# Patient Record
Sex: MALE | Race: WHITE | NOT HISPANIC OR LATINO | URBAN - METROPOLITAN AREA
[De-identification: names, ages, dates, MRNs, and addresses within clinical notes are randomized per-mention and may not be internally consistent; named-entity substitution may affect disease eponyms.]

---

## 2018-04-25 ENCOUNTER — EMERGENCY (EMERGENCY)
Facility: HOSPITAL | Age: 73
LOS: 0 days | Discharge: HOME | End: 2018-04-25
Attending: EMERGENCY MEDICINE | Admitting: EMERGENCY MEDICINE

## 2018-04-25 VITALS
WEIGHT: 179.9 LBS | TEMPERATURE: 98 F | OXYGEN SATURATION: 97 % | HEIGHT: 68 IN | DIASTOLIC BLOOD PRESSURE: 75 MMHG | SYSTOLIC BLOOD PRESSURE: 113 MMHG | RESPIRATION RATE: 18 BRPM | HEART RATE: 83 BPM

## 2018-04-25 VITALS — HEIGHT: 68 IN | WEIGHT: 179.9 LBS

## 2018-04-25 DIAGNOSIS — Z88.1 ALLERGY STATUS TO OTHER ANTIBIOTIC AGENTS STATUS: ICD-10-CM

## 2018-04-25 DIAGNOSIS — Z48.89 ENCOUNTER FOR OTHER SPECIFIED SURGICAL AFTERCARE: ICD-10-CM

## 2018-04-25 DIAGNOSIS — L02.212 CUTANEOUS ABSCESS OF BACK [ANY PART, EXCEPT BUTTOCK]: ICD-10-CM

## 2018-04-25 RX ORDER — AZTREONAM 2 G
1 VIAL (EA) INJECTION
Qty: 14 | Refills: 0 | OUTPATIENT
Start: 2018-04-25 | End: 2018-05-01

## 2018-04-25 NOTE — ED PROVIDER NOTE - MEDICAL DECISION MAKING DETAILS
I personally evaluated the patient. I reviewed the Resident’s or Physician Assistant’s note (as assigned above), and agree with the findings and plan except as documented in my note. 71yo M with a h/o RA. About 1 weeks ago pt had a cyst for many years and last week was at a function and the area became irritated, pt went to an C and was given ABX. Pt with acute reaction to doxy after being out in the sun. No fever, chills, n/v, cp, sob, pleuritic cp, palpitations, diaphoresis, cough, ha/lh/dizziness, numbness/tingling, neck pain/ stiffness, abd pain, diarrhea, constipation, melena/brbpr, urinary symptoms, trauma, weakness, edema, calf pain/swelling/erythema, sick contacts, recent travel or rash.  Vital Signs: I have reviewed the initial vital signs. Constitutional: WDWN in nad. Sitting on stretcher in nad.  Integumentary: No rash. ENT: MMM NECK: Supple, non-tender, no menineal signs. Cardiovascular: RRR, radial pulses 2/4 b/l. No JVD. Respiratory: BS present b/l, ctabl, no wheezing or crackles, good resp effort and excursion, good air exchange,  no accessory muscle use, no stridor. Gastrointestinal: BS present throughout all 4 quadrants, soft, nd, nt, no rebound tenderness or guarding, no cvat. Musculoskeletal: FROM, no edema, no calf pain/swelling/erythema. Neurologic: AAOx3, motor 5/5 and sensation intact throughout upper and lowe ext, CN II-XII intact, No facial droop or slurring of speech. No focal deficits.  left lumbar 3x3cm abscess mild erythema, ecchymosis present, no crepitus, mild fluctuance, no induration, no active purulent d/c, no odor. Doxy was changed to augmentin on Monday 73yo M with a h/o RA and small cyst to  L lower back present for many years presents w/ ~1 week ago of abscess where cyst was. last week was at a function and the area became irritated, pt went to an C and was given ABX- doxy. Pt with acute reaction to doxycycline after being out in the sun. went back to the urgent care today and switched abx to augmentin and told to come to ed for eval. of abscess. No fever, chills, n/v, cp, sob, pleuritic cp, palpitations, diaphoresis, cough, ha/lh/dizziness, numbness/tingling, neck pain/ stiffness, abd pain, diarrhea, constipation, melena/brbpr, urinary symptoms, trauma, weakness, edema, calf pain/swelling/erythema, sick contacts, recent travel. No history of MRSA. On exam; Vital Signs: I have reviewed the initial vital signs. Constitutional: WDWN in nad. Integumentary: Erythema to arms and face from doxy reaction, L upper buttock with ~ 3X3 cm abscess healing well, no surrounding erythema, +fluctuance present, no active purulent discahrge, very small puncture wound present from previous incision where pt reports not much was drained from, mild pain to palpation, no crepitus, no odor, no other abcesses. ENT: MMM NECK: Supple, non-tender, no meningeal signs. Cardiovascular: RRR, radial pulses 2/4 b/l. No JVD. Respiratory: BS present b/l, ctabl, no wheezing or crackles, good resp effort and excursion, good air exchange,  no accessory muscle use, no stridor. Gastrointestinal: BS present throughout all 4 quadrants, soft, nd, nt, no rebound tenderness or guarding, no cvat. Musculoskeletal: FROM, no edema.Neurologic: AAOx3, motor 5/5 and sensation intact throughout upper and lowe ext, CN II-XII intact, No facial droop or slurring of speech. No focal deficits.    Plan: I and D, Doxy was changed to augmentin, will add bactrom, reassess.

## 2018-04-25 NOTE — ED PROVIDER NOTE - OBJECTIVE STATEMENT
71 yo male sent to ED  for wound check lower back. Patient had cyst to right lower back for years. On 4/16 he was started on Doxycycline. On 4/23 he was switched to Doxycyline (patient had photosensitivity) and was I&D. Today went for a wound check and was sent to ED by [facility] RN. No fevers.

## 2018-04-25 NOTE — ED PROVIDER NOTE - PROGRESS NOTE DETAILS
will make larger incision for better drainage of abscess pt has very small incion done at [facility], bigger incision done with extensive amout of purulent discharge obtained, pt was taken off doxycycline monday, started on augmentin will add bactrim pt aware of probiotics, feeling better, no symptoms, aware of signs and syptoms to return for, surgery follow up. pt has very small incison done at [facility], bigger incision done with extensive amount of purulent discharge obtained, pt was taken off doxycycline monday, started on augmentin will add bactrim pt aware of probiotics, feeling better, no symptoms, aware of signs and syptoms to return for, surgery follow up.

## 2018-04-25 NOTE — ED PROVIDER NOTE - PHYSICAL EXAMINATION
Physical Exam  Constitutional: well-nourished, appears stated age, no acute distress  Skin: +healing abscess with 2mm opening with purulent drainage noted. +redness to skin (photosensitivity from Doxycycline)

## 2019-06-06 ENCOUNTER — EMERGENCY (EMERGENCY)
Facility: HOSPITAL | Age: 74
LOS: 0 days | Discharge: HOME | End: 2019-06-06
Attending: EMERGENCY MEDICINE | Admitting: EMERGENCY MEDICINE
Payer: MEDICARE

## 2019-06-06 VITALS
HEIGHT: 67 IN | SYSTOLIC BLOOD PRESSURE: 111 MMHG | TEMPERATURE: 97 F | OXYGEN SATURATION: 98 % | WEIGHT: 179.9 LBS | HEART RATE: 158 BPM | RESPIRATION RATE: 18 BRPM | DIASTOLIC BLOOD PRESSURE: 73 MMHG

## 2019-06-06 VITALS
DIASTOLIC BLOOD PRESSURE: 71 MMHG | SYSTOLIC BLOOD PRESSURE: 107 MMHG | RESPIRATION RATE: 18 BRPM | HEART RATE: 79 BPM | OXYGEN SATURATION: 98 %

## 2019-06-06 DIAGNOSIS — I47.1 SUPRAVENTRICULAR TACHYCARDIA: ICD-10-CM

## 2019-06-06 DIAGNOSIS — R07.9 CHEST PAIN, UNSPECIFIED: ICD-10-CM

## 2019-06-06 DIAGNOSIS — Z79.899 OTHER LONG TERM (CURRENT) DRUG THERAPY: ICD-10-CM

## 2019-06-06 LAB
ALBUMIN SERPL ELPH-MCNC: 4.1 G/DL — SIGNIFICANT CHANGE UP (ref 3.5–5.2)
ALP SERPL-CCNC: 103 U/L — SIGNIFICANT CHANGE UP (ref 30–115)
ALT FLD-CCNC: 23 U/L — SIGNIFICANT CHANGE UP (ref 0–41)
ANION GAP SERPL CALC-SCNC: 12 MMOL/L — SIGNIFICANT CHANGE UP (ref 7–14)
AST SERPL-CCNC: 29 U/L — SIGNIFICANT CHANGE UP (ref 0–41)
BASE EXCESS BLDV CALC-SCNC: 0.8 MMOL/L — SIGNIFICANT CHANGE UP (ref -2–2)
BILIRUB SERPL-MCNC: 0.3 MG/DL — SIGNIFICANT CHANGE UP (ref 0.2–1.2)
BUN SERPL-MCNC: 16 MG/DL — SIGNIFICANT CHANGE UP (ref 10–20)
CA-I SERPL-SCNC: 1.16 MMOL/L — SIGNIFICANT CHANGE UP (ref 1.12–1.3)
CALCIUM SERPL-MCNC: 9.4 MG/DL — SIGNIFICANT CHANGE UP (ref 8.5–10.1)
CHLORIDE SERPL-SCNC: 102 MMOL/L — SIGNIFICANT CHANGE UP (ref 98–110)
CO2 SERPL-SCNC: 26 MMOL/L — SIGNIFICANT CHANGE UP (ref 17–32)
CREAT SERPL-MCNC: 1.2 MG/DL — SIGNIFICANT CHANGE UP (ref 0.7–1.5)
GAS PNL BLDV: 143 MMOL/L — SIGNIFICANT CHANGE UP (ref 136–145)
GAS PNL BLDV: SIGNIFICANT CHANGE UP
GLUCOSE SERPL-MCNC: 138 MG/DL — HIGH (ref 70–99)
HCO3 BLDV-SCNC: 27 MMOL/L — SIGNIFICANT CHANGE UP (ref 22–29)
HCT VFR BLD CALC: 46.1 % — SIGNIFICANT CHANGE UP (ref 42–52)
HCT VFR BLDA CALC: 48.6 % — HIGH (ref 34–44)
HGB BLD CALC-MCNC: 15.8 G/DL — SIGNIFICANT CHANGE UP (ref 14–18)
HGB BLD-MCNC: 15 G/DL — SIGNIFICANT CHANGE UP (ref 14–18)
HOROWITZ INDEX BLDV+IHG-RTO: 21 — SIGNIFICANT CHANGE UP
LACTATE BLDV-MCNC: 1.5 MMOL/L — SIGNIFICANT CHANGE UP (ref 0.5–1.6)
LIDOCAIN IGE QN: 32 U/L — SIGNIFICANT CHANGE UP (ref 7–60)
MAGNESIUM SERPL-MCNC: 2.3 MG/DL — SIGNIFICANT CHANGE UP (ref 1.8–2.4)
MCHC RBC-ENTMCNC: 28.2 PG — SIGNIFICANT CHANGE UP (ref 27–31)
MCHC RBC-ENTMCNC: 32.5 G/DL — SIGNIFICANT CHANGE UP (ref 32–37)
MCV RBC AUTO: 86.7 FL — SIGNIFICANT CHANGE UP (ref 80–94)
NRBC # BLD: 0 /100 WBCS — SIGNIFICANT CHANGE UP (ref 0–0)
NT-PROBNP SERPL-SCNC: 476 PG/ML — HIGH (ref 0–300)
PCO2 BLDV: 50 MMHG — SIGNIFICANT CHANGE UP (ref 41–51)
PH BLDV: 7.35 — SIGNIFICANT CHANGE UP (ref 7.26–7.43)
PLATELET # BLD AUTO: 297 K/UL — SIGNIFICANT CHANGE UP (ref 130–400)
PO2 BLDV: 25 MMHG — SIGNIFICANT CHANGE UP (ref 20–40)
POTASSIUM BLDV-SCNC: 4.8 MMOL/L — SIGNIFICANT CHANGE UP (ref 3.3–5.6)
POTASSIUM SERPL-MCNC: 4.9 MMOL/L — SIGNIFICANT CHANGE UP (ref 3.5–5)
POTASSIUM SERPL-SCNC: 4.9 MMOL/L — SIGNIFICANT CHANGE UP (ref 3.5–5)
PROT SERPL-MCNC: 6.9 G/DL — SIGNIFICANT CHANGE UP (ref 6–8)
RBC # BLD: 5.32 M/UL — SIGNIFICANT CHANGE UP (ref 4.7–6.1)
RBC # FLD: 13.2 % — SIGNIFICANT CHANGE UP (ref 11.5–14.5)
SAO2 % BLDV: 42 % — SIGNIFICANT CHANGE UP
SODIUM SERPL-SCNC: 140 MMOL/L — SIGNIFICANT CHANGE UP (ref 135–146)
TROPONIN T SERPL-MCNC: <0.01 NG/ML — SIGNIFICANT CHANGE UP
WBC # BLD: 9.2 K/UL — SIGNIFICANT CHANGE UP (ref 4.8–10.8)
WBC # FLD AUTO: 9.2 K/UL — SIGNIFICANT CHANGE UP (ref 4.8–10.8)

## 2019-06-06 PROCEDURE — 71045 X-RAY EXAM CHEST 1 VIEW: CPT | Mod: 26

## 2019-06-06 PROCEDURE — 99291 CRITICAL CARE FIRST HOUR: CPT | Mod: GC

## 2019-06-06 RX ORDER — ASPIRIN/CALCIUM CARB/MAGNESIUM 324 MG
325 TABLET ORAL ONCE
Refills: 0 | Status: COMPLETED | OUTPATIENT
Start: 2019-06-06 | End: 2019-06-06

## 2019-06-06 RX ORDER — DILTIAZEM HCL 120 MG
10 CAPSULE, EXT RELEASE 24 HR ORAL ONCE
Refills: 0 | Status: COMPLETED | OUTPATIENT
Start: 2019-06-06 | End: 2019-06-06

## 2019-06-06 RX ADMIN — Medication 10 MILLIGRAM(S): at 06:08

## 2019-06-06 RX ADMIN — Medication 325 MILLIGRAM(S): at 06:20

## 2019-06-06 NOTE — ED PROVIDER NOTE - CLINICAL SUMMARY MEDICAL DECISION MAKING FREE TEXT BOX
Pt presented w SVT. Converted to NSR after 10 mg diltiazem. Feels well. Observed in ED. I have full discussed the medical management and delivery of care with the patient. Patient confirms understanding and has been given detailed return precautions. Patient instructed to return to the ED should symptoms persist or worsen. Patient is well appearing upon discharge.

## 2019-06-06 NOTE — ED PROVIDER NOTE - NSFOLLOWUPINSTRUCTIONS_ED_ALL_ED_FT
Supraventricular Tachycardia    Supraventricular tachycardia (SVT) is a type of abnormal heart rhythm that causes your heart to beat very quickly. A normal heart rate is 60?100 beats per minute. During an episode of SVT, your heart rate may be 150?250 beats per minute. This can make you feel light-headed and short of breath. Although SVT is usually harmless, when SVT happens often or it lasts for long periods, it can lead to heart weakness and failure. SVT can be triggered by stress, smoking, alcohol, caffeine, or stimulant drugs. Treatment may involve certain maneuvers, medicines, or electric shock (cardioversion).     SEEK IMMEDIATE MEDICAL CARE IF YOU HAVE ANY OF THE FOLLOWING SYMPTOMS: chest pain, shortness of breath, dizziness/lightheadedness, or fainting.

## 2019-06-06 NOTE — ED PROVIDER NOTE - NS ED ROS FT
Constitutional: NAD  Eyes: No visual changes, eye pain or discharge.  ENMT: No hearing changes, pain, discharge or infections. No neck pain or stiffness.  Cardiac: + chest pain. No SOB or edema.   Respiratory: No cough or respiratory distress.   GI: No nausea, vomiting, diarrhea or abdominal pain.  : No dysuria, frequency or burning.  MS: No myalgia, muscle weakness, joint pain or back pain.  Neuro: No headache or weakness. No LOC.  Skin: No skin rash.  Heme: No bruising

## 2019-06-06 NOTE — ED PROVIDER NOTE - OBJECTIVE STATEMENT
73M pmh paroxysmal afib not on a/c p/w rapid heartbeat and midsternal CP since last night. Pt has had similar symptoms in past but usually it resolves on its own. Denies f/c, n/v/d, abd pain, back pain, calf pain/swelling.

## 2019-06-06 NOTE — ED PROVIDER NOTE - CARE PROVIDER_API CALL
J Carlos Griffin (MD)  Cardiovascular Disease; Internal Medicine; Interventional Cardiology  [address] [place] [address]  Phone: [phone]  Fax: [phone]  Follow Up Time:

## 2019-06-06 NOTE — ED PROVIDER NOTE - ATTENDING CONTRIBUTION TO CARE
I personally evaluated the patient. I reviewed the Resident’s or Physician Assistant’s note (as assigned above), and agree with the findings and plan except as documented in my note.    74 y/o M presents w rapid HR since 10 PM. No SOB.No PE risk factors. No nausea, vomiting. No abd pain.     CONSTITUTIONAL: Well-developed; well-nourished; in no acute distress. Sitting up and providing appropriate history and physical examination  SKIN: skin exam is warm and dry, no acute rash.  HEAD: Normocephalic; atraumatic.  EYES: PERRL, 3 mm bilateral, no nystagmus, EOM intact; conjunctiva and sclera clear.  ENT: No nasal discharge; airway clear.  NECK: Supple; non tender.+ full passive ROM in all directions. No JVD  CARD: S1, S2 normal; tachy, regular pulse  RESP: No wheezes, rales or rhonchi. Good air movement bilaterally  ABD: soft; non-distended; non-tender. No Rebound, No Gaurding, No signs of peritnitis, No CVA tenderness  EXT: Normal ROM. No clubbing, cyanosis or edema. Dp and Pt Pulses intact. Cap refill less than 3 seconds  NEURO: CN 2-12 intact, normal finger to nose, normal romberg, stable gait, no sensory or motor deficits, Alert, oriented, grossly unremarkable. No Focal deficits. GCS 15. NIH 0  PSYCH: Cooperative, appropriate.

## 2020-05-29 NOTE — ED PROVIDER NOTE - PHYSICAL EXAMINATION
Problem: Adult Inpatient Plan of Care  Goal: Plan of Care Review  Outcome: Ongoing, Progressing  Flowsheets (Taken 5/29/2020 1347)  Plan of Care Reviewed With: patient  Goal: Patient-Specific Goal (Individualization)  Outcome: Ongoing, Progressing  Flowsheets (Taken 5/29/2020 1347)  Individualized Care Needs: warm blankets,  Anxieties, Fears or Concerns: none     Problem: Fatigue (Oncology Care)  Goal: Improved Activity Tolerance  Outcome: Ongoing, Progressing  Intervention: Promote Energy Conservation  Flowsheets (Taken 5/29/2020 1347)  Fatigue Management: frequent rest breaks encouraged; fatigue-related activity identified  Sleep/Rest Enhancement: relaxation techniques promoted  Activity Management: activity encouraged     Pt tolerated IVF and zofran well today. Vitals remain stable. Reviewed follow-up appointments. All questions were answered, ambulated independently at d/c.    General: AOx4, Non toxic appearing, NAD, speaking in full sentences.   Skin: Warm and dry, no acute rash.   Head:  Normocephalic, atraumatic.   EENT: PERRL/EOMI, conjunctiva and sclera clear. MM moist, no nasal discharge.    Neck: Supple nt, no meningeal signs.   Lymph: No acute cervical lymphadenopathy  Heart tachy, regular s1s2 nl, no rub/murmur.   Lungs- No retractions, BS equal, CTAB.   Abdomen: Soft ntnd no r/g.   Extremities- moves all, +equal distal pulses, brisk cap refill. No LE edema, calves nttp b/l.  Neuro: Sensation wnl, CN 2-12 grossly intact. +5/5 muscle strength throughout.  Psych: Cooperative, appropriate

## 2021-02-01 ENCOUNTER — APPOINTMENT (OUTPATIENT)
Dept: RHEUMATOLOGY | Facility: CLINIC | Age: 76
End: 2021-02-01

## 2021-07-29 PROBLEM — I48.91 UNSPECIFIED ATRIAL FIBRILLATION: Chronic | Status: ACTIVE | Noted: 2019-06-06

## 2021-07-29 PROBLEM — Z00.00 ENCOUNTER FOR PREVENTIVE HEALTH EXAMINATION: Status: ACTIVE | Noted: 2021-07-29

## 2021-08-02 ENCOUNTER — NON-APPOINTMENT (OUTPATIENT)
Age: 76
End: 2021-08-02

## 2021-08-02 ENCOUNTER — APPOINTMENT (OUTPATIENT)
Dept: RHEUMATOLOGY | Facility: CLINIC | Age: 76
End: 2021-08-02
Payer: MEDICARE

## 2021-08-02 VITALS
TEMPERATURE: 97 F | BODY MASS INDEX: 28.25 KG/M2 | SYSTOLIC BLOOD PRESSURE: 140 MMHG | HEART RATE: 85 BPM | DIASTOLIC BLOOD PRESSURE: 60 MMHG | HEIGHT: 67 IN | WEIGHT: 180 LBS | OXYGEN SATURATION: 96 %

## 2021-08-02 DIAGNOSIS — M25.50 PAIN IN UNSPECIFIED JOINT: ICD-10-CM

## 2021-08-02 DIAGNOSIS — Z78.9 OTHER SPECIFIED HEALTH STATUS: ICD-10-CM

## 2021-08-02 DIAGNOSIS — Z82.3 FAMILY HISTORY OF STROKE: ICD-10-CM

## 2021-08-02 PROCEDURE — 99205 OFFICE O/P NEW HI 60 MIN: CPT

## 2021-08-02 NOTE — PHYSICAL EXAM
[General Appearance - Alert] : alert [General Appearance - In No Acute Distress] : in no acute distress [Sclera] : the sclera and conjunctiva were normal [PERRL With Normal Accommodation] : pupils were equal in size, round, and reactive to light [Extraocular Movements] : extraocular movements were intact [Outer Ear] : the ears and nose were normal in appearance [Oropharynx] : the oropharynx was normal [Neck Appearance] : the appearance of the neck was normal [Neck Cervical Mass (___cm)] : no neck mass was observed [Jugular Venous Distention Increased] : there was no jugular-venous distention [Thyroid Diffuse Enlargement] : the thyroid was not enlarged [Thyroid Nodule] : there were no palpable thyroid nodules [Auscultation Breath Sounds / Voice Sounds] : lungs were clear to auscultation bilaterally [Heart Rate And Rhythm] : heart rate was normal and rhythm regular [Heart Sounds] : normal S1 and S2 [Heart Sounds Gallop] : no gallops [Murmurs] : no murmurs [Heart Sounds Pericardial Friction Rub] : no pericardial rub [Bowel Sounds] : normal bowel sounds [Abdomen Soft] : soft [Abdomen Tenderness] : non-tender [Abdomen Mass (___ Cm)] : no abdominal mass palpated [Skin Color & Pigmentation] : normal skin color and pigmentation [Skin Turgor] : normal skin turgor [] : no rash [Deep Tendon Reflexes (DTR)] : deep tendon reflexes were 2+ and symmetric [Sensation] : the sensory exam was normal to light touch and pinprick [No Focal Deficits] : no focal deficits [Oriented To Time, Place, And Person] : oriented to person, place, and time [Impaired Insight] : insight and judgment were intact [Affect] : the affect was normal [FreeTextEntry1] : R thumb IP joint swollen

## 2021-08-02 NOTE — ASSESSMENT
[FreeTextEntry1] : 76 y M with history as below presented with joint pains. \par \par R thumb swelling \par Multiple joint pains\par d/d includes acute gout, palindromic rheumatism, CPPD, OA, Lyme\par recent UA was 7.9\par RF, CCP\par Lyme AB\par Xr hands, feet\par US R hand\par advised to avoid alcohol, red meat, seafood\par Refused medications (colchicine, meloxicam)\par He can take Aleve PRN at home\par \par vitamin D insufficiency\par will recheck level\par \par HM\par COVID vaccine advised\par \par fu 4 wk\par \par Total time spent was   . Time was divided in review of labs and imaging studies, obtaining history, personally examining patient, counselling patient/ family, ordering medications/tests/ imaging tests, communicating with other health care providers, documentation in electronic health records, independent interpretation of labs, imaging results and procedure results and communicating to the patient/family and care co ordination.\par \par

## 2021-08-02 NOTE — HISTORY OF PRESENT ILLNESS
[FreeTextEntry1] : 76 y M with history as below presented with joint pains. 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 He did not have any swelling. \par This year, he has had swelling episodes of finger joints , thumb joints lasting for 2 weeks. He got Indomethacin and  prednisone taper from PCP which resolved the episode. But after discontinuing the medicines the thumb joint swelling came back and persisted for 2 weeks. HE also has right wrist swelling as well. \par \par He denied any rashes, oral ulcers, chest pain, hair loss.

## 2021-08-03 ENCOUNTER — TRANSCRIPTION ENCOUNTER (OUTPATIENT)
Age: 76
End: 2021-08-03

## 2021-08-21 ENCOUNTER — TRANSCRIPTION ENCOUNTER (OUTPATIENT)
Age: 76
End: 2021-08-21

## 2021-08-21 ENCOUNTER — NON-APPOINTMENT (OUTPATIENT)
Age: 76
End: 2021-08-21

## 2021-08-30 ENCOUNTER — APPOINTMENT (OUTPATIENT)
Dept: RHEUMATOLOGY | Facility: CLINIC | Age: 76
End: 2021-08-30
Payer: MEDICARE

## 2021-08-30 VITALS
HEIGHT: 67 IN | DIASTOLIC BLOOD PRESSURE: 70 MMHG | WEIGHT: 180 LBS | BODY MASS INDEX: 28.25 KG/M2 | TEMPERATURE: 97.9 F | OXYGEN SATURATION: 97 % | HEART RATE: 73 BPM | SYSTOLIC BLOOD PRESSURE: 140 MMHG

## 2021-08-30 DIAGNOSIS — M10.9 GOUT, UNSPECIFIED: ICD-10-CM

## 2021-08-30 PROCEDURE — 99215 OFFICE O/P EST HI 40 MIN: CPT

## 2021-08-30 NOTE — DISCUSSION/SUMMARY
[FreeTextEntry1] : Patient called stating his joint pains that previously resolved have returned to involve his wrists, shoulders, and thumb. Thumb is swollen. No AM stiffness. I advised medrol dose pack. He has not tried Advil or Aleve and wishes to try this first and if no improvement I advised him to call the office back. He an his wife understand, agree to plan and all questions answered

## 2021-08-30 NOTE — HISTORY OF PRESENT ILLNESS
[FreeTextEntry1] : 76 y M with history as below presented with joint pains. \par \par Interval history\par He has been having pains in his feet lasting for 3 days. \par He has had R thumb swelling lasted for a few days. He also had a fever at the time. COVID test was reportedly negative. \par HE is not able to make a fist. 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 He did not have any swelling. \par This year, he has had swelling episodes of finger joints , thumb joints lasting for 2 weeks. He got Indomethacin and  prednisone taper from PCP which resolved the episode. But after discontinuing the medicines the thumb joint swelling came back and persisted for 2 weeks. HE also has right wrist swelling as well. \par \par He denied any rashes, oral ulcers, chest pain, hair loss.

## 2021-08-30 NOTE — ASSESSMENT
[FreeTextEntry1] : 76 y M with history as below presented with joint pains. \par \par R thumb swelling \par Multiple joint pains\par d/d includes acute gout, palindromic rheumatism, CPPD, OA, Lyme\par recent UA was 7.9. repeat UA in 8/21 is 8.1\par RF, CCP neg\par Lyme AB neg\par Xr hands showed OA\par  feet XR showed OA and inflammatory changes first toe left\par US R hand showed osteophyte at R thumb\par advised to avoid alcohol, red meat, seafood\par Refused medications (colchicine, meloxicam)\par He can take Aleve PRN at home\par Advised allopurinol 100 mg daily. r/b/a discussed. \par uric acid level in 3 mo\par \par vitamin D insufficiency\par level WNL\par \par HM\par COVID vaccine advised\par \par fu 3 mo\par \par Total time spent was   . Time was divided in review of labs and imaging studies, obtaining history, personally examining patient, counselling patient/ family, ordering medications/tests/ imaging tests, communicating with other health care providers, documentation in electronic health records, independent interpretation of labs, imaging results and procedure results and communicating to the patient/family and care co ordination.\par \par

## 2021-09-02 NOTE — ED ADULT TRIAGE NOTE - MEANS OF ARRIVAL
What Type Of Note Output Would You Prefer (Optional)?: Standard Output Is The Patient Presenting As Previously Scheduled?: Yes How Severe Is Your Rash?: moderate Is This A New Presentation, Or A Follow-Up?: Rash ambulatory

## 2021-11-29 ENCOUNTER — APPOINTMENT (OUTPATIENT)
Dept: RHEUMATOLOGY | Facility: CLINIC | Age: 76
End: 2021-11-29
Payer: MEDICARE

## 2021-11-29 PROCEDURE — 99215 OFFICE O/P EST HI 40 MIN: CPT

## 2021-11-29 NOTE — HISTORY OF PRESENT ILLNESS
[FreeTextEntry1] : 76 y M with history as below presented with joint pains. \par \par Interval history\par He has been having pains in his feet with walking, \par He has had no thumb swelling. \par He is tolerating allopurinol. \par HE is not able to make a fist. 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 He did not have any swelling. \par This year, he has had swelling episodes of finger joints , thumb joints lasting for 2 weeks. He got Indomethacin and  prednisone taper from PCP which resolved the episode. But after discontinuing the medicines the thumb joint swelling came back and persisted for 2 weeks. HE also has right wrist swelling as well. \par \par He denied any rashes, oral ulcers, chest pain, hair loss.

## 2021-11-29 NOTE — ASSESSMENT
[FreeTextEntry1] : 76 y M with history as below presented with joint pains. \par \par Chronic gout /OA\par Multiple joint pains\par  \par recent UA was 7.9. repeat UA in 8/21 is 8.1\par RF, CCP neg\par Lyme AB neg\par Xr hands showed OA\par  feet XR showed OA and inflammatory changes first toe left\par US R hand showed osteophyte at R thumb\par advised to avoid alcohol, red meat, seafood\par Refused medications (colchicine, meloxicam)\par He can take Aleve PRN at home\par Advised c/w allopurinol 100 mg daily. \par uric acid level today \par \par Inability to bend fingers/tendon malfunction\par PT has not worked\par tendon injections have not worked\par he was advised to discuss surgery\par \par vitamin D insufficiency\par level WNL\par \par HM\par COVID vaccine advised\par \par fu 3 mo\par \par Total time spent was   . Time was divided in review of labs and imaging studies, obtaining history, personally examining patient, counselling patient/ family, ordering medications/tests/ imaging tests, communicating with other health care providers, documentation in electronic health records, independent interpretation of labs, imaging results and procedure results and communicating to the patient/family and care co ordination.\par \par

## 2022-02-01 ENCOUNTER — APPOINTMENT (OUTPATIENT)
Dept: RHEUMATOLOGY | Facility: CLINIC | Age: 77
End: 2022-02-01
Payer: MEDICARE

## 2022-02-01 VITALS
SYSTOLIC BLOOD PRESSURE: 122 MMHG | HEIGHT: 68 IN | WEIGHT: 180 LBS | BODY MASS INDEX: 27.28 KG/M2 | HEART RATE: 79 BPM | TEMPERATURE: 98 F | OXYGEN SATURATION: 98 % | DIASTOLIC BLOOD PRESSURE: 70 MMHG

## 2022-02-01 PROCEDURE — 99215 OFFICE O/P EST HI 40 MIN: CPT

## 2022-02-01 NOTE — ASSESSMENT
[FreeTextEntry1] : 76 y M with history as below presented with joint pains. \par \par Chronic gout /OA\par Multiple joint pains\par  \par recent UA was 7.9. repeat UA in 8/21 is 8.1, 12/21 UA 7.1 on allopurinol 100\par RF, CCP neg\par Lyme AB neg\par Xr hands showed OA\par  feet XR showed OA and inflammatory changes first toe left\par US R hand showed osteophyte at R thumb\par advised to avoid alcohol, red meat, seafood\par Refused medications (colchicine, meloxicam)\par He can take Aleve PRN at home\par he wants to d/c allopurinol 100 mg daily, and see how UA reacts \par uric acid level in 1-2 months\par Nomore gout attacks. HIs joint pains are ikely mechanical. ortho f/u\par \par Inability to bend fingers/tendon malfunction\par PT has not worked\par tendon injections have not worked\par he was advised to discuss surgery\par \par vitamin D insufficiency\par level WNL\par \par HM\par COVID vaccine advised\par \par fu 2 mo\par \par Total time spent was   . Time was divided in review of labs and imaging studies, obtaining history, personally examining patient, counselling patient/ family, ordering medications/tests/ imaging tests, communicating with other health care providers, documentation in electronic health records, independent interpretation of labs, imaging results and procedure results and communicating to the patient/family and care co ordination.\par \par

## 2022-02-01 NOTE — HISTORY OF PRESENT ILLNESS
[FreeTextEntry1] : 76 y M with history as below presented with joint pains. \par \par Interval history\par Foot pain is better. \par He has had no thumb swelling. \par He is tolerating allopurinol. \par HE is not able to make a fist. 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 He did not have any swelling. \par This year, he has had swelling episodes of finger joints , thumb joints lasting for 2 weeks. He got Indomethacin and  prednisone taper from PCP which resolved the episode. But after discontinuing the medicines the thumb joint swelling came back and persisted for 2 weeks. HE also has right wrist swelling as well. \par \par He denied any rashes, oral ulcers, chest pain, hair loss.

## 2022-04-04 ENCOUNTER — APPOINTMENT (OUTPATIENT)
Dept: RHEUMATOLOGY | Facility: CLINIC | Age: 77
End: 2022-04-04
Payer: MEDICARE

## 2022-04-04 VITALS
WEIGHT: 180 LBS | TEMPERATURE: 97.9 F | DIASTOLIC BLOOD PRESSURE: 60 MMHG | BODY MASS INDEX: 28.25 KG/M2 | HEART RATE: 94 BPM | HEIGHT: 67 IN | OXYGEN SATURATION: 98 % | SYSTOLIC BLOOD PRESSURE: 130 MMHG

## 2022-04-04 DIAGNOSIS — M25.641 STIFFNESS OF RIGHT HAND, NOT ELSEWHERE CLASSIFIED: ICD-10-CM

## 2022-04-04 PROCEDURE — 99213 OFFICE O/P EST LOW 20 MIN: CPT

## 2022-04-04 RX ORDER — INDOMETHACIN 25 MG/1
25 CAPSULE ORAL
Qty: 9 | Refills: 0 | Status: DISCONTINUED | COMMUNITY
Start: 2021-07-05 | End: 2022-04-04

## 2022-04-04 RX ORDER — PREDNISONE 20 MG/1
20 TABLET ORAL
Qty: 12 | Refills: 0 | Status: DISCONTINUED | COMMUNITY
Start: 2021-07-05 | End: 2022-04-04

## 2022-04-04 RX ORDER — ALLOPURINOL 100 MG/1
100 TABLET ORAL DAILY
Qty: 90 | Refills: 1 | Status: DISCONTINUED | COMMUNITY
Start: 2021-08-30 | End: 2022-04-04

## 2022-04-04 NOTE — HISTORY OF PRESENT ILLNESS
[FreeTextEntry1] : Pt was previously seen by Dr. Wall for two issues:\par 1. R 2nd and 4th finger stiffness x years, with difficulty grasping, works as a manual draftsman (now semi-retired) and had to switch from using a pencil to a pen because of difficulty holding the pencil and applying pressure. Can't fully flex 2nd and 4th fingers; previously had steroid injections by ortho with no improvement. Was told he would benefit from ?tendon surgery but he does not wish to undergo surgery because of concern about anesthesia risk.\par \par 2. Episodes of severe midfoot pain, redness and swelling which would occur "after I stepped on a curb wrong" or if he was standing a lot while working. He was previously diagnosed with gout, was taking allopurinol but stopped taking it in February 2022. Pt believes his symptoms are also occupation-related as opposed to gout. He has not had an issue with the foot pain recently.\par \par Physical exam: GEN: AAO man sitting on exam table in NAD\par SKIN: No rashes\par PULM: Clear to auscultation b/l\par CV: Regular rate and rhythm, no murmurs\par MSK:\par Shoulders: Full ROM b/l\par Elbows: Full ROM b/l, no effusions\par Wrists: + Limited flexion and extension on R to approx 30 degrees\par Hands: + Heberden's nodes b/l, + limited flexion in R 2nd and 4th fingers in DIPs, PIPs and MCPs\par Hips: Full ROM b/l\par Knees: no effusions, full ROM b/l\par Ankles: no effusions, full ROM b/l\par Feet: no effusions, no TTP\par EXT: no LE edema b/l

## 2022-04-04 NOTE — ASSESSMENT
[FreeTextEntry1] : R hand stiffness: Although pt has e/o degenerative arthritis on his exam, his R 2nd and 4th finger stiffness, which are his primary complaint, are more suggestive of a tendon pathology - trigger finger? than arthritis. Writers cramp is also on the differential but writers cramp is a/w muscle dystonia, and pt's symptoms are less suggestive of this.\par - I advised pt that the best treatment option for his symptoms would be a surgical intervention, there are no medications which would help with his chronic stiffness specifically; for degenerative disease, medications are for pain management but do not help with stiffness or loss of function. I suggested that pt can try additional occupational therapy but he declined.

## 2022-09-03 NOTE — ED ADULT TRIAGE NOTE - NSWEIGHTCALCTOOLDRUG_GEN_A_CORE
Thank you for connecting with us today on the [facility] service of [facility]. If you have any questions after your visit, please feel free to contact us at [phone]. If you are experiencing a medical emergency, call 911 immediately.  If your symptoms worsen or do not improve, please contact your primary care provider to schedule an in-person visit. Symptoms that require immediate attention require a visit at Urgent Care ([place]), [facility] ([place]) or the Emergency Room of a nearby hospital.    If you have any billing questions please call the billing department.    Phone: [phone]. Hours: Mon. - Thu. 7:30 a.m. - 6 p.m. and Friday 7:30 p.m. - 5 p.m.       used

## 2023-02-23 ENCOUNTER — OUTPATIENT (OUTPATIENT)
Dept: OUTPATIENT SERVICES | Facility: HOSPITAL | Age: 78
LOS: 1 days | End: 2023-02-23
Payer: MEDICARE

## 2023-02-23 DIAGNOSIS — Z00.8 ENCOUNTER FOR OTHER GENERAL EXAMINATION: ICD-10-CM

## 2023-02-23 DIAGNOSIS — I65.29 OCCLUSION AND STENOSIS OF UNSPECIFIED CAROTID ARTERY: ICD-10-CM

## 2023-02-23 PROCEDURE — 93880 EXTRACRANIAL BILAT STUDY: CPT | Mod: 26

## 2023-02-23 PROCEDURE — 93880 EXTRACRANIAL BILAT STUDY: CPT

## 2023-02-24 DIAGNOSIS — I65.29 OCCLUSION AND STENOSIS OF UNSPECIFIED CAROTID ARTERY: ICD-10-CM

## 2024-02-05 ENCOUNTER — APPOINTMENT (OUTPATIENT)
Dept: ORTHOPEDIC SURGERY | Facility: CLINIC | Age: 79
End: 2024-02-05

## 2025-06-24 ENCOUNTER — APPOINTMENT (OUTPATIENT)
Dept: ORTHOPEDIC SURGERY | Facility: CLINIC | Age: 80
End: 2025-06-24
Payer: MEDICARE

## 2025-06-24 PROBLEM — M17.11 ARTHRITIS OF KNEE, RIGHT: Status: ACTIVE | Noted: 2025-06-24

## 2025-06-24 PROCEDURE — 73130 X-RAY EXAM OF HAND: CPT | Mod: LT

## 2025-06-24 PROCEDURE — 99203 OFFICE O/P NEW LOW 30 MIN: CPT

## 2025-06-24 PROCEDURE — 73562 X-RAY EXAM OF KNEE 3: CPT | Mod: RT

## 2025-06-24 RX ORDER — IBUPROFEN 800 MG/1
800 TABLET, FILM COATED ORAL 3 TIMES DAILY
Qty: 90 | Refills: 0 | Status: ACTIVE | COMMUNITY
Start: 2025-06-24 | End: 1900-01-01

## 2025-08-29 ENCOUNTER — APPOINTMENT (OUTPATIENT)
Dept: ORTHOPEDIC SURGERY | Facility: CLINIC | Age: 80
End: 2025-08-29
Payer: MEDICARE

## 2025-08-29 DIAGNOSIS — M70.22 OLECRANON BURSITIS, LEFT ELBOW: ICD-10-CM

## 2025-08-29 PROCEDURE — 73080 X-RAY EXAM OF ELBOW: CPT | Mod: LT

## 2025-08-29 PROCEDURE — 99213 OFFICE O/P EST LOW 20 MIN: CPT
